# Patient Record
Sex: MALE | URBAN - METROPOLITAN AREA
[De-identification: names, ages, dates, MRNs, and addresses within clinical notes are randomized per-mention and may not be internally consistent; named-entity substitution may affect disease eponyms.]

---
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
## 2022-04-02 ASSESSMENT — VISUAL ACUITY
OS_PH: SC 20/25
OS_CC: 20/40
OD_CC: 20/50
OS_PH: SC 20/25
OD_PH: SC 20/25
OD_PH: SC 20/25
OD_CC: 20/70-1
OS_CC: 20/40-1

## 2022-04-02 ASSESSMENT — TONOMETRY
OS_IOP_MMHG: 15
OS_IOP_MMHG: 22
OD_IOP_MMHG: 15
OD_IOP_MMHG: 20